# Patient Record
Sex: FEMALE | Race: WHITE | Employment: OTHER | URBAN - METROPOLITAN AREA
[De-identification: names, ages, dates, MRNs, and addresses within clinical notes are randomized per-mention and may not be internally consistent; named-entity substitution may affect disease eponyms.]

---

## 2018-04-20 ENCOUNTER — OP HISTORICAL/CONVERTED ENCOUNTER (OUTPATIENT)
Dept: OTHER | Age: 58
End: 2018-04-20

## 2020-01-27 LAB — PAP SMEAR, EXTERNAL: NORMAL

## 2020-02-24 LAB — MAMMOGRAPHY, EXTERNAL: NORMAL

## 2020-07-17 VITALS
DIASTOLIC BLOOD PRESSURE: 77 MMHG | SYSTOLIC BLOOD PRESSURE: 122 MMHG | WEIGHT: 211.6 LBS | HEIGHT: 63 IN | BODY MASS INDEX: 37.49 KG/M2

## 2020-07-17 PROBLEM — K76.0 NAFLD (NONALCOHOLIC FATTY LIVER DISEASE): Status: ACTIVE | Noted: 2020-07-17

## 2020-07-17 PROBLEM — E78.00 HYPERCHOLESTEROLEMIA: Status: ACTIVE | Noted: 2020-07-17

## 2020-07-17 PROBLEM — I10 HYPERTENSIVE DISORDER: Status: ACTIVE | Noted: 2020-07-17

## 2020-07-17 PROBLEM — E83.110 HEREDITARY HEMOCHROMATOSIS (HCC): Status: ACTIVE | Noted: 2020-07-17

## 2020-07-17 PROBLEM — Z78.0 MENOPAUSE: Status: ACTIVE | Noted: 2020-07-17

## 2020-07-17 PROBLEM — K21.9 ACID REFLUX: Status: ACTIVE | Noted: 2020-07-17

## 2020-07-17 PROBLEM — N85.00 ENDOMETRIAL HYPERPLASIA: Status: ACTIVE | Noted: 2020-07-17

## 2020-07-17 PROBLEM — I73.9 PERIPHERAL VASCULAR DISEASE (HCC): Status: ACTIVE | Noted: 2020-07-17

## 2020-07-17 RX ORDER — DIAZEPAM 10 MG/1
10 TABLET ORAL
COMMUNITY
End: 2022-05-17

## 2020-07-17 RX ORDER — LEVOTHYROXINE SODIUM 25 UG/1
TABLET ORAL
COMMUNITY

## 2020-07-17 RX ORDER — AMITRIPTYLINE HYDROCHLORIDE 50 MG/1
TABLET, FILM COATED ORAL
COMMUNITY

## 2020-07-17 RX ORDER — AMITRIPTYLINE HYDROCHLORIDE 25 MG/1
TABLET, FILM COATED ORAL
COMMUNITY
End: 2022-05-17

## 2020-07-17 RX ORDER — SUMATRIPTAN 50 MG/1
50 TABLET, FILM COATED ORAL
COMMUNITY

## 2020-07-17 RX ORDER — MONTELUKAST SODIUM 10 MG/1
10 TABLET ORAL DAILY
COMMUNITY

## 2021-02-25 VITALS
SYSTOLIC BLOOD PRESSURE: 122 MMHG | DIASTOLIC BLOOD PRESSURE: 77 MMHG | WEIGHT: 211.6 LBS | BODY MASS INDEX: 37.49 KG/M2 | HEIGHT: 63 IN

## 2021-02-25 PROBLEM — M41.35: Status: ACTIVE | Noted: 2021-02-25

## 2021-02-25 PROBLEM — F41.9 ANXIETY: Status: ACTIVE | Noted: 2021-02-25

## 2021-02-25 PROBLEM — E87.6 HYPOKALEMIA: Status: ACTIVE | Noted: 2021-02-25

## 2021-02-25 PROBLEM — F32.9 MAJOR DEPRESSIVE DISORDER, SINGLE EPISODE, UNSPECIFIED: Status: ACTIVE | Noted: 2021-02-25

## 2021-02-25 PROBLEM — Z00.00 WELL ADULT HEALTH CHECK: Status: ACTIVE | Noted: 2021-02-25

## 2021-02-25 PROBLEM — E04.1 THYROID NODULE: Status: ACTIVE | Noted: 2021-02-25

## 2021-02-25 PROBLEM — M17.9 OSTEOARTHRITIS OF KNEE: Status: ACTIVE | Noted: 2021-02-25

## 2021-02-25 PROBLEM — F32.A DEPRESSION: Status: ACTIVE | Noted: 2021-02-25

## 2021-02-25 PROBLEM — R60.0 LEG EDEMA: Status: ACTIVE | Noted: 2021-02-25

## 2021-02-25 PROBLEM — R51.9 HEADACHE: Status: ACTIVE | Noted: 2021-02-25

## 2021-02-25 PROBLEM — J82.83 ASTHMATIC PULMONARY EOSINOPHILIA: Status: ACTIVE | Noted: 2021-02-25

## 2021-02-25 PROBLEM — M47.816 LUMBAR SPONDYLOSIS: Status: ACTIVE | Noted: 2021-02-25

## 2021-02-25 PROBLEM — M79.7 FIBROMYOSITIS: Status: ACTIVE | Noted: 2021-02-25

## 2021-02-25 PROBLEM — R06.00 DYSPNEA: Status: ACTIVE | Noted: 2021-02-25

## 2021-02-25 PROBLEM — J30.9 ALLERGIC RHINITIS: Status: ACTIVE | Noted: 2021-02-25

## 2021-02-25 PROBLEM — K90.9 MALABSORPTION SYNDROME: Status: ACTIVE | Noted: 2021-02-25

## 2021-02-25 PROBLEM — M19.90 ARTHRITIS: Status: ACTIVE | Noted: 2021-02-25

## 2021-02-25 PROBLEM — E78.5 HYPERLIPIDEMIA: Status: ACTIVE | Noted: 2020-07-17

## 2021-02-25 PROBLEM — G89.29 CHRONIC PAIN: Status: ACTIVE | Noted: 2021-02-25

## 2021-02-25 PROBLEM — R41.3 POOR MEMORY: Status: ACTIVE | Noted: 2021-02-25

## 2021-02-25 PROBLEM — J45.909 UNSPECIFIED ASTHMA, UNCOMPLICATED: Status: ACTIVE | Noted: 2021-02-25

## 2021-02-25 PROBLEM — Z98.84 STATUS POST BARIATRIC SURGERY: Status: ACTIVE | Noted: 2021-02-25

## 2021-02-25 PROBLEM — M54.50 LOW BACK PAIN: Status: ACTIVE | Noted: 2021-02-25

## 2021-02-25 PROBLEM — E83.119 HEMOCHROMATOSIS: Status: ACTIVE | Noted: 2020-07-17

## 2021-02-25 PROBLEM — K75.81 NONALCOHOLIC STEATOHEPATITIS (NASH): Status: ACTIVE | Noted: 2021-02-25

## 2021-02-25 PROBLEM — E56.1 VITAMIN K DEFICIENCY: Status: ACTIVE | Noted: 2021-02-25

## 2021-02-25 PROBLEM — M81.0 OSTEOPOROSIS: Status: ACTIVE | Noted: 2021-02-25

## 2021-02-25 PROBLEM — I10 HTN (HYPERTENSION): Status: ACTIVE | Noted: 2021-02-25

## 2021-02-25 PROBLEM — R79.89 HIGH SERUM FERRITIN: Status: ACTIVE | Noted: 2021-02-25

## 2021-02-25 PROBLEM — Z88.1 ALLERGY STATUS TO OTHER ANTIBIOTIC AGENTS STATUS: Status: ACTIVE | Noted: 2021-02-25

## 2021-03-01 ENCOUNTER — OFFICE VISIT (OUTPATIENT)
Dept: OBGYN CLINIC | Age: 61
End: 2021-03-01
Payer: COMMERCIAL

## 2021-03-01 ENCOUNTER — OFFICE VISIT (OUTPATIENT)
Dept: OBGYN CLINIC | Age: 61
End: 2021-03-01

## 2021-03-01 VITALS
WEIGHT: 208 LBS | BODY MASS INDEX: 36.86 KG/M2 | SYSTOLIC BLOOD PRESSURE: 132 MMHG | HEIGHT: 63 IN | TEMPERATURE: 97 F | DIASTOLIC BLOOD PRESSURE: 74 MMHG

## 2021-03-01 DIAGNOSIS — Z12.31 VISIT FOR SCREENING MAMMOGRAM: Primary | ICD-10-CM

## 2021-03-01 DIAGNOSIS — Z01.419 GYNECOLOGIC EXAM NORMAL: Primary | ICD-10-CM

## 2021-03-01 DIAGNOSIS — N95.1 MENOPAUSAL SYNDROME: ICD-10-CM

## 2021-03-01 PROCEDURE — 99396 PREV VISIT EST AGE 40-64: CPT | Performed by: OBSTETRICS & GYNECOLOGY

## 2021-03-01 PROCEDURE — 77067 SCR MAMMO BI INCL CAD: CPT | Performed by: OBSTETRICS & GYNECOLOGY

## 2021-03-01 NOTE — PROGRESS NOTES
Ralph Hernandez is a , 56 y.o. female   No LMP recorded. (Menstrual status: Menopause). She presents for her annual    She is having no significant problems. Menstrual status:  Her periods are: Menopause. Cycles are: Menopause. She does not have dysmenorrhea. Medical conditions:  Since her last annual GYN exam about one year ago, she has not the following changes in her health history: none. Past Medical History:   Diagnosis Date    Acid reflux 2020    Acid reflux     Arthritis     Asthma     Depression     Endometrial hyperplasia     Endometrial hyperplasia 2020    GERD (gastroesophageal reflux disease)     [facility]reditary hemochromatosis ([facility] [address] 2020    Hypercholesterolemia 2020    Hypertension     Hypertensive disorder 2020    Menopause 2020    Morbid obesity ([facility] [address]     NAFLD (nonalcoholic fatty liver disease) 2020    Other ill-defined conditions(799.89)     VIT D DEFICIENCY    Other ill-defined conditions(799.89)     FIBROMYALGIA    PCOS (polycystic ovarian syndrome)     Peripheral vascular disease ([facility] [address] 2020    Psychiatric disorder     ANXIETY/DEPRESSION    Thyroid disease     NODULE     Past Surgical History:   Procedure Laterality Date    HX ABDOMINOPLASTY      HX COLONOSCOPY  2015    HX HEENT      R SINUS CYST    HX OTHER SURGICAL      COLONOSCOPY    TN ABDOMEN SURGERY PROC UNLISTED      GASTROPLASTY    TN ABDOMEN SURGERY PROC UNLISTED      GASTRIC BYPASS    TN LAP GASTRIC BYPASS/NAEEM-EN-Y           Allergies   Allergen Reactions    Beef Containing Products Diarrhea    Beef Extract Diarrhea    Cephalexin Rash and Nausea and Vomiting    Milk Diarrhea    Onion Diarrhea    Other Medication Diarrhea     PECANS    Shellfish Containing Products Diarrhea     PECANS    Adhesive Rash, Itching and Other (comments)     BLISTER  BLISTER          Tobacco History:  reports that she has never smoked. She has never used smokeless tobacco.  Alcohol Abuse:  reports no history of alcohol use. Drug Abuse:  reports no history of drug use. Family Medical/Cancer History:   Family History   Problem Relation Age of Onset    Cancer Mother           Review of Systems   Constitutional: Negative for chills, fever, malaise/fatigue and weight loss. HENT: Negative for congestion, ear pain, sinus pain and tinnitus. Eyes: Negative for blurred vision and double vision. Respiratory: Negative for cough, shortness of breath and wheezing. Cardiovascular: Negative for chest pain and palpitations. Gastrointestinal: Negative for abdominal pain, blood in stool, constipation, diarrhea, heartburn, nausea and vomiting. Genitourinary: Negative for dysuria, flank pain, frequency, hematuria and urgency. Musculoskeletal: Negative for joint pain and myalgias. Skin: Negative for itching and rash. Neurological: Negative for dizziness, weakness and headaches. Psychiatric/Behavioral: Negative for depression, memory loss and suicidal ideas. The patient is not nervous/anxious and does not have insomnia. Physical Exam  Constitutional:       Appearance: Normal appearance. HENT:      Head: Normocephalic and atraumatic. Cardiovascular:      Rate and Rhythm: Normal rate. Heart sounds: Normal heart sounds. Pulmonary:      Effort: Pulmonary effort is normal.      Breath sounds: Normal breath sounds. Chest:      Breasts:         Right: Normal.         Left: Normal.   Abdominal:      General: Abdomen is flat. Palpations: Abdomen is soft. Genitourinary:     General: Normal vulva. Vagina: Normal.      Cervix: Normal.      Uterus: Normal.       Adnexa: Right adnexa normal and left adnexa normal.      Rectum: Normal.      Comments: No PAP obtained  Neurological:      Mental Status: She is alert.    Psychiatric:         Mood and Affect: Mood normal.         Behavior: Behavior normal.         Thought Content: Thought content normal.          Visit Vitals  /74 (BP 1 Location: Right arm, BP Patient Position: Sitting)   Temp 97 °F (36.1 °C)   Ht 5' 2.8\" (1.595 m)   Wt 208 lb (94.3 kg)   BMI 37.08 kg/m²         Assessment:  Diagnoses and all orders for this visit:    1. Gynecologic exam normal    2. Menopausal syndrome        Plan:QUestions addressed  Counseled re: diet, exercise, healthy lifestyle  Return for Annual  Rec annual mammogram      Follow-up and Dispositions    · Return for 1 yr annual, 1 yr mammo.

## 2021-03-27 PROBLEM — Z00.00 WELL ADULT HEALTH CHECK: Status: RESOLVED | Noted: 2021-02-25 | Resolved: 2021-03-27

## 2021-08-03 PROBLEM — I10 HYPERTENSIVE DISORDER: Status: RESOLVED | Noted: 2020-07-17 | Resolved: 2021-08-03

## 2021-08-03 PROBLEM — F32.A DEPRESSION: Status: RESOLVED | Noted: 2021-02-25 | Resolved: 2021-08-03

## 2022-03-18 PROBLEM — E04.1 THYROID NODULE: Status: ACTIVE | Noted: 2021-02-25

## 2022-03-18 PROBLEM — F32.9 MAJOR DEPRESSIVE DISORDER, SINGLE EPISODE, UNSPECIFIED: Status: ACTIVE | Noted: 2021-02-25

## 2022-03-18 PROBLEM — M19.90 ARTHRITIS: Status: ACTIVE | Noted: 2021-02-25

## 2022-03-18 PROBLEM — R41.3 POOR MEMORY: Status: ACTIVE | Noted: 2021-02-25

## 2022-03-18 PROBLEM — K75.81 NONALCOHOLIC STEATOHEPATITIS (NASH): Status: ACTIVE | Noted: 2021-02-25

## 2022-03-18 PROBLEM — M17.9 OSTEOARTHRITIS OF KNEE: Status: ACTIVE | Noted: 2021-02-25

## 2022-03-18 PROBLEM — R06.00 DYSPNEA: Status: ACTIVE | Noted: 2021-02-25

## 2022-03-18 PROBLEM — G89.29 CHRONIC PAIN: Status: ACTIVE | Noted: 2021-02-25

## 2022-03-18 PROBLEM — I73.9 PERIPHERAL VASCULAR DISEASE (HCC): Status: ACTIVE | Noted: 2020-07-17

## 2022-03-18 PROBLEM — R79.89 HIGH SERUM FERRITIN: Status: ACTIVE | Noted: 2021-02-25

## 2022-03-19 PROBLEM — K76.0 NONALCOHOLIC FATTY LIVER DISEASE: Status: ACTIVE | Noted: 2020-07-17

## 2022-03-19 PROBLEM — I10 HTN (HYPERTENSION): Status: ACTIVE | Noted: 2021-02-25

## 2022-03-19 PROBLEM — K21.9 ACID REFLUX: Status: ACTIVE | Noted: 2020-07-17

## 2022-03-19 PROBLEM — M54.50 LOW BACK PAIN: Status: ACTIVE | Noted: 2021-02-25

## 2022-03-19 PROBLEM — F41.9 ANXIETY: Status: ACTIVE | Noted: 2021-02-25

## 2022-03-19 PROBLEM — E56.1 VITAMIN K DEFICIENCY: Status: ACTIVE | Noted: 2021-02-25

## 2022-03-19 PROBLEM — J30.9 ALLERGIC RHINITIS: Status: ACTIVE | Noted: 2021-02-25

## 2022-03-19 PROBLEM — J45.909 UNSPECIFIED ASTHMA, UNCOMPLICATED: Status: ACTIVE | Noted: 2021-02-25

## 2022-03-19 PROBLEM — Z98.84 STATUS POST BARIATRIC SURGERY: Status: ACTIVE | Noted: 2021-02-25

## 2022-03-19 PROBLEM — E83.119 HEMOCHROMATOSIS: Status: ACTIVE | Noted: 2020-07-17

## 2022-03-19 PROBLEM — R51.9 HEADACHE: Status: ACTIVE | Noted: 2021-02-25

## 2022-03-19 PROBLEM — E78.5 HYPERLIPIDEMIA: Status: ACTIVE | Noted: 2020-07-17

## 2022-03-19 PROBLEM — J82.83 ASTHMATIC PULMONARY EOSINOPHILIA: Status: ACTIVE | Noted: 2021-02-25

## 2022-03-19 PROBLEM — N85.00 ENDOMETRIAL HYPERPLASIA: Status: ACTIVE | Noted: 2020-07-17

## 2022-03-19 PROBLEM — R60.0 LEG EDEMA: Status: ACTIVE | Noted: 2021-02-25

## 2022-03-19 PROBLEM — M79.7 FIBROMYOSITIS: Status: ACTIVE | Noted: 2021-02-25

## 2022-03-19 PROBLEM — E87.6 HYPOKALEMIA: Status: ACTIVE | Noted: 2021-02-25

## 2022-03-19 PROBLEM — M81.0 OSTEOPOROSIS: Status: ACTIVE | Noted: 2021-02-25

## 2022-03-20 PROBLEM — Z78.0 MENOPAUSE: Status: ACTIVE | Noted: 2020-07-17

## 2022-03-20 PROBLEM — K90.9 MALABSORPTION SYNDROME: Status: ACTIVE | Noted: 2021-02-25

## 2022-03-20 PROBLEM — M47.816 LUMBAR SPONDYLOSIS: Status: ACTIVE | Noted: 2021-02-25

## 2022-03-20 PROBLEM — M41.35: Status: ACTIVE | Noted: 2021-02-25

## 2022-05-17 ENCOUNTER — OFFICE VISIT (OUTPATIENT)
Dept: OBGYN CLINIC | Age: 62
End: 2022-05-17
Payer: COMMERCIAL

## 2022-05-17 VITALS
SYSTOLIC BLOOD PRESSURE: 128 MMHG | DIASTOLIC BLOOD PRESSURE: 76 MMHG | BODY MASS INDEX: 41.15 KG/M2 | HEIGHT: 63 IN | WEIGHT: 232.25 LBS

## 2022-05-17 DIAGNOSIS — N95.1 MENOPAUSAL SYNDROME: ICD-10-CM

## 2022-05-17 DIAGNOSIS — Z12.4 SCREENING FOR MALIGNANT NEOPLASM OF CERVIX: Primary | ICD-10-CM

## 2022-05-17 DIAGNOSIS — Z01.419 ROUTINE GYNECOLOGICAL EXAMINATION: ICD-10-CM

## 2022-05-17 PROCEDURE — 99396 PREV VISIT EST AGE 40-64: CPT | Performed by: OBSTETRICS & GYNECOLOGY

## 2022-05-17 NOTE — PROGRESS NOTES
Chief Complaint   Patient presents with    Annual Exam     Mammo done here today     Visit Vitals  /76 (BP 1 Location: Left upper arm, BP Patient Position: Sitting, BP Cuff Size: Large adult)   Ht 5' 2.8\" (1.595 m)   Wt 232 lb 4 oz (105.3 kg)   BMI 41.40 kg/m²

## 2022-05-17 NOTE — PROGRESS NOTES
Dyana Marte is a , 59 y.o. female   No LMP recorded. (Menstrual status: Menopause). She presents for her annual    She is having no significant problems. Menstrual status:  Cycles are menopausal.    Flow: absent. She does not have dysmenorrhea. Medical conditions:  Since her last annual GYN exam about one year ago, she has not the following changes in her health history: none. Mammogram History:    C[facility]RERA Results (most recent):  Results from Office Visit encounter on 21    CABRERA 3D [facility] W MAMMO BI SCREENING INCL CAD       DEXA Results (most recent):  No results found for this or any previous visit.          Past Medical History:   Diagnosis Date    Acid reflux 2020    Acid reflux     Arthritis     Asthma     Depression     Endometrial hyperplasia     Endometrial hyperplasia 2020    GERD (gastroesophageal reflux disease)     Hereditary hemochromatosis ([address] 2020    Hypercholesterolemia 2020    Hypertension     Hypertensive disorder 2020    Menopause 2020    Morbid obesity ([address]     NAFLD (nonalcoholic fatty liver disease) 2020    Other ill-defined conditions(799.89)     VIT D DEFICIENCY    Other ill-defined conditions(799.89)     FIBROMYALGIA    PCOS (polycystic ovarian syndrome)     Peripheral vascular disease ([address] 2020    Psychiatric disorder     ANXIETY/DEPRESSION    Thyroid disease     NODULE     Past Surgical History:   Procedure Laterality Date    HX ABDOMINOPLASTY      HX COLONOSCOPY  2015    HX HEENT      R SINUS CYST    HX OTHER SURGICAL  2009    COLONOSCOPY    RI ABDOMEN SURGERY PROC UNLISTED      GASTROPLASTY    RI ABDOMEN SURGERY PROC UNLISTED      GASTRIC BYPASS    RI LAP GASTRIC BYPASS/NAEEM-EN-Y             Allergies   Allergen Reactions    Beef Containing Products Diarrhea    Beef Extract Diarrhea    Cephalexin Rash and Nausea and Vomiting    Milk Diarrhea    Onion Diarrhea  Other Medication Diarrhea     PECANS    Shellfish Containing Products Diarrhea     PECANS    Adhesive Rash, Itching and Other (comments)     BLISTER  BLISTER          Tobacco History:  reports that she has never smoked. She has never used smokeless tobacco.  Alcohol use:  reports no history of alcohol use. Drug use:  reports no history of drug use. Family Medical/Cancer History:   Family History   Problem Relation Age of Onset    Cancer Mother           Review of Systems   Constitutional: Negative for chills, fever, malaise/fatigue and weight loss. HENT: Negative for congestion, ear pain, sinus pain and tinnitus. Eyes: Negative for blurred vision and double vision. Respiratory: Negative for cough, shortness of breath and wheezing. Cardiovascular: Negative for chest pain and palpitations. Gastrointestinal: Negative for abdominal pain, blood in stool, constipation, diarrhea, heartburn, nausea and vomiting. Genitourinary: Negative for dysuria, flank pain, frequency, hematuria and urgency. Musculoskeletal: Negative for joint pain and myalgias. Skin: Negative for itching and rash. Neurological: Negative for dizziness, weakness and headaches. Psychiatric/Behavioral: Negative for depression, memory loss and suicidal ideas. The patient is not nervous/anxious and does not have insomnia. Physical Exam  Constitutional:       Appearance: Normal appearance. HENT:      Head: Normocephalic and atraumatic. Cardiovascular:      Rate and Rhythm: Normal rate. Heart sounds: Normal heart sounds. Pulmonary:      Effort: Pulmonary effort is normal.      Breath sounds: Normal breath sounds. Chest:   Breasts:      Right: Normal.      Left: Normal.       Abdominal:      General: Abdomen is flat. Palpations: Abdomen is soft. Genitourinary:     General: Normal vulva.       Vagina: Normal.      Cervix: Normal.      Uterus: Normal.       Adnexa: Right adnexa normal and left adnexa normal.      Rectum: Normal.      Comments: PAP Obtained  Neurological:      Mental Status: She is alert. Psychiatric:         Mood and Affect: Mood normal.         Behavior: Behavior normal.         Thought Content: Thought content normal.          Visit Vitals  /76 (BP 1 Location: Left upper arm, BP Patient Position: Sitting, BP Cuff Size: Large adult)   Ht 5' 2.8\" (1.595 m)   Wt 232 lb 4 oz (105.3 kg)   BMI 41.40 kg/m²         Assessment:   Diagnoses and all orders for this visit:    1. Screening for malignant neoplasm of cervix  -     PAP IG, RFX APTIMA HPV ASCUS ([id number])    2. Routine gynecological examination  -     PAP IG, RFX APTIMA HPV ASCUS ([id number])    3. Menopausal syndrome        Plan: Questions addressed  Counseled re: diet, exercise, healthy lifestyle  Return for Annual  Rec annual mammogram        Follow-up and Dispositions    · Return for 1 yr annual, 1 yr mammo.

## 2022-05-20 LAB
CYTOLOGIST CVX/VAG CYTO: NORMAL
CYTOLOGY CVX/VAG DOC CYTO: NORMAL
CYTOLOGY CVX/VAG DOC THIN PREP: NORMAL
DX ICD CODE: NORMAL
LABCORP, 190119: NORMAL
Lab: NORMAL
OTHER STN SPEC: NORMAL
STAT OF ADQ CVX/VAG CYTO-IMP: NORMAL

## 2023-05-15 ENCOUNTER — TELEPHONE (OUTPATIENT)
Age: 63
End: 2023-05-15

## 2023-05-15 NOTE — TELEPHONE ENCOUNTER
05/15/23 R/T call to patient's daughter as she left a vm to reschedule her mother's appt---unable to reach her as her vm states to enter a mailbox#---tried to contact the patient and was unable to reach--left the patient a detailed vm to call back.

## 2023-11-28 ENCOUNTER — TELEPHONE (OUTPATIENT)
Age: 63
End: 2023-11-28

## 2023-11-28 NOTE — TELEPHONE ENCOUNTER
Left a voicemail on 11/28 at 3:37 PM. Needs to confirm she has 2 appointments scheduled.      I returned the patients call on 11/28 at 4:08 PM. Patient is aware she does have her mammogram appointment on 2:30 and annual at 3:15 PM

## 2023-12-14 ENCOUNTER — OFFICE VISIT (OUTPATIENT)
Age: 63
End: 2023-12-14

## 2023-12-14 VITALS — HEIGHT: 63 IN | BODY MASS INDEX: 41.4 KG/M2

## 2023-12-14 DIAGNOSIS — Z01.419 GYNECOLOGIC EXAM NORMAL: ICD-10-CM

## 2023-12-14 DIAGNOSIS — Z12.4 PAP SMEAR FOR CERVICAL CANCER SCREENING: Primary | ICD-10-CM

## 2023-12-14 DIAGNOSIS — N95.1 MENOPAUSAL SYNDROME: ICD-10-CM

## 2025-05-02 ENCOUNTER — OFFICE VISIT (OUTPATIENT)
Age: 65
End: 2025-05-02
Payer: MEDICARE

## 2025-05-02 VITALS — WEIGHT: 251.38 LBS | HEIGHT: 63 IN | BODY MASS INDEX: 44.54 KG/M2

## 2025-05-02 DIAGNOSIS — N95.1 MENOPAUSAL SYNDROME: ICD-10-CM

## 2025-05-02 DIAGNOSIS — Z12.31 SCREENING MAMMOGRAM FOR BREAST CANCER: Primary | ICD-10-CM

## 2025-05-02 DIAGNOSIS — Z01.419 GYNECOLOGIC EXAM NORMAL: ICD-10-CM

## 2025-05-02 DIAGNOSIS — Z12.4 PAP SMEAR FOR CERVICAL CANCER SCREENING: ICD-10-CM

## 2025-05-02 PROCEDURE — G8417 CALC BMI ABV UP PARAM F/U: HCPCS | Performed by: OBSTETRICS & GYNECOLOGY

## 2025-05-02 PROCEDURE — G0101 CA SCREEN;PELVIC/BREAST EXAM: HCPCS | Performed by: OBSTETRICS & GYNECOLOGY

## 2025-05-02 PROCEDURE — G8427 DOCREV CUR MEDS BY ELIG CLIN: HCPCS | Performed by: OBSTETRICS & GYNECOLOGY

## 2025-05-02 ASSESSMENT — ENCOUNTER SYMPTOMS
RESPIRATORY NEGATIVE: 1
GASTROINTESTINAL NEGATIVE: 1

## 2025-05-02 NOTE — PROGRESS NOTES
Mila Chan is a No obstetric history on file., 64 y.o. female   No LMP recorded. (Menstrual status: Menopause).    She presents for her annual    She is having no significant problems.      Menstrual status:  Cycles are menopausal.    Flow: absent.      She does not have dysmenorrhea.      Medical conditions:  Since her last annual GYN exam about one year ago, she has not the following changes in her health history: none.     Mammogram History:    ALVIN Results (most recent):  @Bycler([id number]:1)@     DEXA Results (most recent):  @Bycler([id number]:1)@       Past Medical History:   Diagnosis Date    Acid reflux 7/17/2020    Arthritis     Asthma     Depression     Endometrial hyperplasia 7/17/2020    GERD (gastroesophageal reflux disease)     Hereditary hemochromatosis 7/17/2020    Hypercholesterolemia 7/17/2020    Hypertension     Hypertensive disorder 7/17/2020    Menopause 7/17/2020    Morbid obesity (HCC)     NAFLD (nonalcoholic fatty liver disease) 7/17/2020    Other ill-defined conditions(799.89)     VIT D DEFICIENCY    Other ill-defined conditions(799.89)     FIBROMYALGIA    PCOS (polycystic ovarian syndrome)     Peripheral vascular disease 7/17/2020    Psychiatric disorder     ANXIETY/DEPRESSION    Thyroid disease     NODULE     Past Surgical History:   Procedure Laterality Date    ABDOMINOPLASTY      COLONOSCOPY  08/05/2015    YELITZA  1990    R SINUS CYST    LAP GASTRIC BYPASS/ARDEN-EN-Y      OTHER SURGICAL HISTORY  2009    COLONOSCOPY    MN UNLISTED PROCEDURE ABDOMEN PERITONEUM & OMENTUM  2007    GASTRIC BYPASS    MN UNLISTED PROCEDURE ABDOMEN PERITONEUM & OMENTUM  1986    GASTROPLASTY       Prior to Admission medications    Medication Sig Start Date End Date Taking? Authorizing Provider   BUPROPION HCL PO Take by mouth   Yes Automatic Reconciliation, Ar   albuterol sulfate HFA (PROVENTIL;VENTOLIN;PROAIR) 108 (90 Base) MCG/ACT inhaler Inhale 2 puffs into the lungs every 6 hours as needed

## 2025-05-02 NOTE — PROGRESS NOTES
Chief Complaint   Patient presents with    Annual Exam     [id number]     Ht 1.595 m (5' 2.8\")   Wt 114 kg (251 lb 6 oz)   BMI 44.81 kg/m²

## 2025-05-07 ENCOUNTER — RESULTS FOLLOW-UP (OUTPATIENT)
Age: 65
End: 2025-05-07

## 2025-05-07 LAB
., LABCORP: NORMAL
CYTOLOGIST CVX/VAG CYTO: NORMAL
CYTOLOGY CVX/VAG DOC CYTO: NORMAL
CYTOLOGY CVX/VAG DOC THIN PREP: NORMAL
DX ICD CODE: NORMAL
OTHER STN SPEC: NORMAL
SERVICE CMNT-IMP: NORMAL
STAT OF ADQ CVX/VAG CYTO-IMP: NORMAL